# Patient Record
Sex: MALE | Race: BLACK OR AFRICAN AMERICAN
[De-identification: names, ages, dates, MRNs, and addresses within clinical notes are randomized per-mention and may not be internally consistent; named-entity substitution may affect disease eponyms.]

---

## 2018-05-04 ENCOUNTER — HOSPITAL ENCOUNTER (EMERGENCY)
Dept: HOSPITAL 72 - EMR | Age: 23
Discharge: HOME | End: 2018-05-04
Payer: SELF-PAY

## 2018-05-04 VITALS — DIASTOLIC BLOOD PRESSURE: 69 MMHG | SYSTOLIC BLOOD PRESSURE: 121 MMHG

## 2018-05-04 VITALS — DIASTOLIC BLOOD PRESSURE: 60 MMHG | SYSTOLIC BLOOD PRESSURE: 118 MMHG

## 2018-05-04 VITALS — BODY MASS INDEX: 28.6 KG/M2 | HEIGHT: 75 IN | WEIGHT: 230 LBS

## 2018-05-04 VITALS — DIASTOLIC BLOOD PRESSURE: 70 MMHG | SYSTOLIC BLOOD PRESSURE: 113 MMHG

## 2018-05-04 VITALS — SYSTOLIC BLOOD PRESSURE: 118 MMHG | DIASTOLIC BLOOD PRESSURE: 60 MMHG

## 2018-05-04 DIAGNOSIS — F12.10: Primary | ICD-10-CM

## 2018-05-04 DIAGNOSIS — R11.10: ICD-10-CM

## 2018-05-04 DIAGNOSIS — R00.2: ICD-10-CM

## 2018-05-04 DIAGNOSIS — R61: ICD-10-CM

## 2018-05-04 LAB
ADD MANUAL DIFF: NO
ALBUMIN SERPL-MCNC: 4.2 G/DL (ref 3.4–5)
ALBUMIN/GLOB SERPL: 1.1 {RATIO} (ref 1–2.7)
ALP SERPL-CCNC: 57 U/L (ref 46–116)
ALT SERPL-CCNC: 30 U/L (ref 12–78)
ANION GAP SERPL CALC-SCNC: 12 MMOL/L (ref 5–15)
AST SERPL-CCNC: 19 U/L (ref 15–37)
BASOPHILS NFR BLD AUTO: 1.4 % (ref 0–2)
BILIRUB SERPL-MCNC: 0.5 MG/DL (ref 0.2–1)
BUN SERPL-MCNC: 20 MG/DL (ref 7–18)
CALCIUM SERPL-MCNC: 9.6 MG/DL (ref 8.5–10.1)
CHLORIDE SERPL-SCNC: 102 MMOL/L (ref 98–107)
CO2 SERPL-SCNC: 25 MMOL/L (ref 21–32)
CREAT SERPL-MCNC: 1.6 MG/DL (ref 0.55–1.3)
EOSINOPHIL NFR BLD AUTO: 0.3 % (ref 0–3)
ERYTHROCYTE [DISTWIDTH] IN BLOOD BY AUTOMATED COUNT: 11.5 % (ref 11.6–14.8)
GLOBULIN SER-MCNC: 3.7 G/DL
HCT VFR BLD CALC: 44.3 % (ref 42–52)
HGB BLD-MCNC: 14.6 G/DL (ref 14.2–18)
LYMPHOCYTES NFR BLD AUTO: 13.2 % (ref 20–45)
MCV RBC AUTO: 90 FL (ref 80–99)
MONOCYTES NFR BLD AUTO: 8.1 % (ref 1–10)
NEUTROPHILS NFR BLD AUTO: 77 % (ref 45–75)
PLATELET # BLD: 226 K/UL (ref 150–450)
POTASSIUM SERPL-SCNC: 4 MMOL/L (ref 3.5–5.1)
RBC # BLD AUTO: 4.94 M/UL (ref 4.7–6.1)
SODIUM SERPL-SCNC: 139 MMOL/L (ref 136–145)
WBC # BLD AUTO: 11.7 K/UL (ref 4.8–10.8)

## 2018-05-04 PROCEDURE — 80307 DRUG TEST PRSMV CHEM ANLYZR: CPT

## 2018-05-04 PROCEDURE — 84484 ASSAY OF TROPONIN QUANT: CPT

## 2018-05-04 PROCEDURE — 96374 THER/PROPH/DIAG INJ IV PUSH: CPT

## 2018-05-04 PROCEDURE — 85025 COMPLETE CBC W/AUTO DIFF WBC: CPT

## 2018-05-04 PROCEDURE — 80053 COMPREHEN METABOLIC PANEL: CPT

## 2018-05-04 PROCEDURE — 93005 ELECTROCARDIOGRAM TRACING: CPT

## 2018-05-04 PROCEDURE — 80329 ANALGESICS NON-OPIOID 1 OR 2: CPT

## 2018-05-04 PROCEDURE — 36415 COLL VENOUS BLD VENIPUNCTURE: CPT

## 2018-05-04 PROCEDURE — 99283 EMERGENCY DEPT VISIT LOW MDM: CPT

## 2018-05-04 NOTE — EMERGENCY ROOM REPORT
History of Present Illness


General


Chief Complaint:  General Complaint


Source:  Patient





Present Illness


HPI


22-year-old male patient presents to ER BIB ambulance s/p ingestion of 

marijuana bar. EMTs report that friends called ambulance because after taking 

the edible friend isn't here diaphoretic and complained of palpitations.  

Patient denies acute complaints at this time.  Patient reports that he had one 

episode of vomiting.  Patient reports he "feels fine now".  Patient denies fever

, chest pain, shortness of breath, abdominal pain, vision loss, back pain. 

Patient denies thoughts of hurting himself or others. Reports single episode of 

vomiting, denies blood in emesis.


Allergies:  


Coded Allergies:  


     No Known Allergies (Unverified , 5/4/18)





Patient History


Past Medical History:  see triage record


Reviewed Nursing Documentation:  PMH: Agreed; PSxH: Agreed





Nursing Documentation-PMH


Past Medical History:  No Stated History





Review of Systems


All Other Systems:  negative except mentioned in HPI





Physical Exam





Vital Signs








  Date Time  Temp Pulse Resp B/P (MAP) Pulse Ox O2 Delivery O2 Flow Rate FiO2


 


5/4/18 15:27 98.8 118 20 144/106 99 Room Air  





 98.8       








Sp02 EP Interpretation:  reviewed, normal


General Appearance:  well appearing, no apparent distress, alert, GCS 15, non-

toxic


Head:  normocephalic, atraumatic


Eyes:  bilateral eye normal inspection, bilateral eye PERRL


ENT:  hearing grossly normal, normal pharynx, no angioedema, normal voice, 

uvula midline, moist mucus membranes


Neck:  full range of motion


Respiratory:  lungs clear, normal breath sounds, no rhonchi, no respiratory 

distress, no accessory muscle use, no wheezing, speaking full sentences


Cardiovascular #1:  regular rate, rhythm, no edema


Gastrointestinal:  non tender, soft, no mass, non-distended, no guarding, no 

rebound


Musculoskeletal:  back normal, digits/nails normal, gait/station normal, normal 

range of motion, non-tender


Neurologic:  alert, oriented x3, responsive, motor strength/tone normal, 

sensory intact


Psychiatric:  mood/affect normal


Skin:  no rash


Lymphatic:  no adenopathy





Medical Decision Making


PA Attestation


Dr. Martinez is my supervising Physician whom patient management has been 

discussed with.


Diagnostic Impression:  


 Primary Impression:  


 Marijuana use


ER Course


Pt. presents to the ED BIB ambulance c/o drug use.





Ddx considered but are not limited to drug use, alcohol use, drug toxicity.





Vital signs: are WNL, pt. is afebrile





Ordered labs and medication.


EKG, CBC, CMP, Urine drug screen, Acetaminophen level, Salicylate level, serum 

alcohol, Zofran.





ER COURSE:


Provided Zofran for vomiting symptoms.


EKG no ST elevations, no arrhythmias.


Troponin WNL of negative. instructed patient to followup with PCP for further 

treatment and referral to cardiology as needed.


CBC unremarkable


CMP shows small elevation in creatinine. Discuss results with Dr. Martinez, 

will provide fluids to patient.


urine drug screen positive for THC. Instructed patient not to consume edibles 

or smoke marijuana to avoid symptoms.


Serum alcohol, salicylate and acetaminophen level low.


Discuss patient with Dr. Martinez. Agrees with treatment and plan.





Patient resting comfortably in bed, asleep, in no acute distress.





Discuss lab results with patient. Provided copy of results to patient.


IV fluids completed. 


Patient able to tolerate fluids PO.


Patient stable for discharge. 


Patient reports feeling better, ready to go home.


Patient will be picked up by friend.





DISCHARGE: 


At this time pt is stable for d/c to home.  Patient is resting comfortably, in 

no acute distress, nontoxic appearing, talking without difficulty.


Patient to take medications as instructed


Will provide with patient care instructions and any necessary prescriptions.


Care plan and follow-up instructions provided.  


Patient instructed to follow-up with primary care provider in 3 - 5 days.


Patient questions asked and answered.


Patient reports understanding and agreement to treatment plan.


ER precautions given. Patient instructed to return to ER immediately for any 

new or worsening of symptoms including but not limited to increasing SOB, 

persistent fever.





- Please note that this Emergency Department Report was dictated using Dr[employer]rib[employer] technology software, occasionally this can lead to 

erroneous entry secondary to interpretation by the dictation equipment.





Labs








Test


  5/4/18


17:20


 


White Blood Count


  11.7 K/UL


(4.8-10.8)


 


Red Blood Count


  4.94 M/UL


(4.70-6.10)


 


Hemoglobin


  14.6 G/DL


(14.2-18.0)


 


Hematocrit


  44.3 %


(42.0-52.0)


 


Mean Corpuscular Volume 90 FL (80-99) 


 


Mean Corpuscular Hemoglobin


  29.7 PG


(27.0-31.0)


 


Mean Corpuscular Hemoglobin


Concent 33.1 G/DL


(32.0-36.0)


 


Red Cell Distribution Width


  11.5 %


(11.6-14.8)


 


Platelet Count


  226 K/UL


(150-450)


 


Mean Platelet Volume


  7.0 FL


(6.5-10.1)


 


Neutrophils (%) (Auto)


  77.0 %


(45.0-75.0)


 


Lymphocytes (%) (Auto)


  13.2 %


(20.0-45.0)


 


Monocytes (%) (Auto)


  8.1 %


(1.0-10.0)


 


Eosinophils (%) (Auto)


  0.3 %


(0.0-3.0)


 


Basophils (%) (Auto)


  1.4 %


(0.0-2.0)


 


Sodium Level


  139 MMOL/L


(136-145)


 


Potassium Level


  4.0 MMOL/L


(3.5-5.1)


 


Chloride Level


  102 MMOL/L


()


 


Carbon Dioxide Level


  25 MMOL/L


(21-32)


 


Anion Gap


  12 mmol/L


(5-15)


 


Blood Urea Nitrogen


  20 mg/dL


(7-18)


 


Creatinine


  1.6 MG/DL


(0.55-1.30)


 


Estimat Glomerular Filtration


Rate > 60 mL/min


(>60)


 


Glucose Level


  123 MG/DL


()


 


Calcium Level


  9.6 MG/DL


(8.5-10.1)


 


Total Bilirubin


  0.5 MG/DL


(0.2-1.0)


 


Aspartate Amino Transf


(AST/SGOT) 19 U/L (15-37) 


 


 


Alanine Aminotransferase


(ALT/SGPT) 30 U/L (12-78) 


 


 


Alkaline Phosphatase


  57 U/L


()


 


Troponin I


  0.012 ng/mL


(0.000-0.056)


 


Total Protein


  7.9 G/DL


(6.4-8.2)


 


Albumin


  4.2 G/DL


(3.4-5.0)


 


Globulin 3.7 g/dL 


 


Albumin/Globulin Ratio 1.1 (1.0-2.7) 


 


Salicylates Level


  0.6 ug/mL


(2.8-20)


 


Urine Opiates Screen


  Negative


(NEGATIVE)


 


Acetaminophen Level


  < 2 MCG/ML


(10-30)


 


Urine Barbiturates Screen


  Negative


(NEGATIVE)


 


Phencyclidine (PCP) Screen


  Negative


(NEGATIVE)


 


Urine Amphetamines Screen


  Negative


(NEGATIVE)


 


Urine Benzodiazepines Screen


  Negative


(NEGATIVE)


 


Urine Cocaine Screen


  Negative


(NEGATIVE)


 


Urine Marijuana (THC) Screen


  Positive


(NEGATIVE)


 


Serum Alcohol < 3 mg/dL 








EKG Diagnostic Results


Rate:  normal


Rhythm:  NSR


ST Segments:  no acute changes


ASA given to the pt in ED:  No


PA Scribe Text


Neel Chaves PA-C





Rhythm Strip Diag. Results


EP Interpretation:  yes


Rate:  86


Rhythm:  NSR, no PVC's, no ectopy


PA Scribe Text


Neel Chaves PA-C





Last Vital Signs








  Date Time  Temp Pulse Resp B/P (MAP) Pulse Ox O2 Delivery O2 Flow Rate FiO2


 


5/4/18 15:27 98.8 118 20 144/106 99 Room Air  





 98.8       








Disposition:  HOME, SELF-CARE


Condition:  Stable


Patient Instructions:  Drug Overdose





Additional Instructions:  


Followup with primary care provider in 3 -5 days.


Discuss referral to cardiology or further workup as needed due to test results.


Take medications as directed. 


Patient questions asked and answered.


ER precautions given, patient instructed to return to ER immediately for any 

new or worsening of symptoms.











Wilbert Chaves May 4, 2018 16:02

## 2018-05-06 NOTE — CARDIOLOGY REPORT
--------------- APPROVED REPORT --------------





EKG Measurement

Heart [id number]

UT [id number]

[id number]

[id number]

[id number]





Normal sinus rhythm

Early repolarization

Normal ECG